# Patient Record
(demographics unavailable — no encounter records)

---

## 2025-04-02 NOTE — PHYSICAL EXAM
[General Appearance - In No Acute Distress] : in no acute distress [General Appearance - Alert] : alert [Oriented To Time, Place, And Person] : oriented to person, place, and time [Impaired Insight] : insight and judgment were intact [Affect] : the affect was normal [Person] : oriented to person [Place] : oriented to place [Time] : oriented to time [Registration Intact] : recent registration memory intact [Concentration Intact] : normal concentrating ability [Naming Objects] : no difficulty naming common objects [Repeating Phrases] : no difficulty repeating a phrase [Fluency] : fluency intact [Comprehension] : comprehension intact [Past History] : adequate knowledge of personal past history [Cranial Nerves Optic (II)] : visual acuity intact bilaterally,  visual fields full to confrontation, pupils equal round and reactive to light [Cranial Nerves Oculomotor (III)] : extraocular motion intact [Cranial Nerves Trigeminal (V)] : facial sensation intact symmetrically [Cranial Nerves Facial (VII)] : face symmetrical [Cranial Nerves Vestibulocochlear (VIII)] : hearing was intact bilaterally [Cranial Nerves Glossopharyngeal (IX)] : tongue and palate midline [Cranial Nerves Accessory (XI - Cranial And Spinal)] : head turning and shoulder shrug symmetric [Cranial Nerves Hypoglossal (XII)] : there was no tongue deviation with protrusion [Motor Tone] : muscle tone was normal in all four extremities [Motor Strength] : muscle strength was normal in all four extremities [No Muscle Atrophy] : normal bulk in all four extremities [Sensation Tactile Decrease] : light touch was intact [Abnormal Walk] : normal gait [Balance] : balance was intact [2+] : Ankle jerk left 2+ [PERRL With Normal Accommodation] : pupils were equal in size, round, reactive to light, with normal accommodation [Extraocular Movements] : extraocular movements were intact [Full Visual Field] : full visual field [Outer Ear] : the ears and nose were normal in appearance [Hearing Threshold Finger Rub Not [place]] : hearing was normal [] : the neck was supple [Neck Cervical Mass (___cm)] : no neck mass was observed [Romberg's Sign] : Romberg's sign was negtive [Past-pointing] : there was no past-pointing [Tremor] : no tremor present [Plantar Reflex Right Only] : normal on the right [Plantar Reflex Left Only] : normal on the left

## 2025-04-02 NOTE — ADDENDUM
[FreeTextEntry1] : Documented by Paolo Gillespie acting as a scribe for Dr. Ramsey and Walt Ramos PA-C on 04/01/2025 and was presence for the following sections: Physical Exam; Data Reviewed; Assessment; Discussion/Summary. All medical record entries made by the Scribe were at my, Dr. Ramsey, and Walt Ramos, direction and personally dictated by me on 04/01/2025. I have reviewed the chart and agree that the record accurately reflects my personal performance of the history, physical exam, procedure and imaging.

## 2025-04-02 NOTE — DATA REVIEWED
[de-identified] : 10/20/22: MRI Right frontal T2 flair signal intensity, new punctate right posterior frontal region, related to gliosis of migraine affect, other etiologies include inflammatory or demyelinating disease. 2/13/20: MRI brain W/C: single T2/flair signal intensity in left frontal periventricular region, non-specific, no enhancing lesions- abnormalities noted 7/21/18: MRI brain: hypoplastic left cerebellum, otherwise normal.   [de-identified] : Labs: B12 65, vitamin D 33.1, TFTs normal,

## 2025-04-02 NOTE — DISCUSSION/SUMMARY
[FreeTextEntry1] : 40-year-old F with PMHx of menstrual migraine headaches and thyroid nodules, with c/o fatigue, intermittent dizziness and feeling of brain fog, was diagnosed with lupus about 4 years ago, has been on Benlysta and hydroxychloroquine. .  #1) Lupus with chronic Fatigue  #2) menstrual migraines; occasional migraines without aura  #3) MRI brain revealed nonspecific white matter changes, prior pattern not suggestive of MS; strong family history of MS/lupus  # Dizziness  - Will repeat MRI brain with sharona to see evolution of finding sin 3 years - Nurtec ODT for menstrual migraines, can repeat in 2-3 hours, Adverse effects discussed with the pt - continue B12/Vit D supplements, Patient reportedly has had labs with rheumatologist

## 2025-04-02 NOTE — HISTORY OF PRESENT ILLNESS
[de-identified] : The patient is a 40 year old right hand dominant female who presents today complaining of right elbow.   Date of Injury/Onset:  ~ 3 months Pain:    At Rest: 5/10  With Activity:  8/10  Mechanism of injury: NKI,  This is NOT a Work Related Injury being treated under Worker's Compensation. This is NOT an athletic injury occurring associated with an interscholastic or organized sports team. Quality of symptoms:  sharp, shooting into right shoulder and wrist.  Improves with: nothing seems to help  Worse with: picking up objects, grabbing, flexing hand up, writing  Prior treatment: none Prior Imaging: none Out of work/sport: no  School/Sport/Position/Occupation: TA, full-time Additional Information: None

## 2025-04-02 NOTE — REVIEW OF SYSTEMS
[Feeling Tired] : feeling tired [As Noted in HPI] : as noted in HPI [Decr. Concentrating Ability] : decreased concentrating ability [Migraine Headache] : migraine headaches [Sleep Disturbances] : sleep disturbances [Joint Pain] : joint pain [Negative] : Integumentary [Dizziness] : no dizziness [de-identified] : Brain fog [FreeTextEntry8] : Increased urinary frequency [FreeTextEntry9] : Tennis elbow- right arm

## 2025-04-02 NOTE — PHYSICAL EXAM
[General Appearance - Alert] : alert [General Appearance - In No Acute Distress] : in no acute distress [Oriented To Time, Place, And Person] : oriented to person, place, and time [Impaired Insight] : insight and judgment were intact [Affect] : the affect was normal [Person] : oriented to person [Place] : oriented to place [Time] : oriented to time [Registration Intact] : recent registration memory intact [Concentration Intact] : normal concentrating ability [Naming Objects] : no difficulty naming common objects [Repeating Phrases] : no difficulty repeating a phrase [] : Resident [Fluency] : fluency intact [Time Spent: ___ minutes] : I have spent [unfilled] minutes of time on the encounter. [Comprehension] : comprehension intact [Past History] : adequate knowledge of personal past history [Cranial Nerves Optic (II)] : visual acuity intact bilaterally,  visual fields full to confrontation, pupils equal round and reactive to light [Cranial Nerves Oculomotor (III)] : extraocular motion intact [Cranial Nerves Trigeminal (V)] : facial sensation intact symmetrically [Cranial Nerves Facial (VII)] : face symmetrical [Cranial Nerves Vestibulocochlear (VIII)] : hearing was intact bilaterally [Cranial Nerves Glossopharyngeal (IX)] : tongue and palate midline [Cranial Nerves Accessory (XI - Cranial And Spinal)] : head turning and shoulder shrug symmetric [Cranial Nerves Hypoglossal (XII)] : there was no tongue deviation with protrusion [Motor Tone] : muscle tone was normal in all four extremities [Motor Strength] : muscle strength was normal in all four extremities [No Muscle Atrophy] : normal bulk in all four extremities [Sensation Tactile Decrease] : light touch was intact [Abnormal Walk] : normal gait [Balance] : balance was intact [2+] : Ankle jerk left 2+ [PERRL With Normal Accommodation] : pupils were equal in size, round, reactive to light, with normal accommodation [Extraocular Movements] : extraocular movements were intact [Full Visual Field] : full visual field [Hearing Threshold Finger Rub Not [place]] : hearing was normal [Outer Ear] : the ears and nose were normal in appearance [] : the neck was supple [Neck Cervical Mass (___cm)] : no neck mass was observed [Romberg's Sign] : Romberg's sign was negtive [Past-pointing] : there was no past-pointing [Tremor] : no tremor present [Plantar Reflex Right Only] : normal on the right [Plantar Reflex Left Only] : normal on the left

## 2025-04-02 NOTE — REVIEW OF SYSTEMS
[Feeling Tired] : feeling tired [As Noted in HPI] : as noted in HPI [Decr. Concentrating Ability] : decreased concentrating ability [Migraine Headache] : migraine headaches [Sleep Disturbances] : sleep disturbances [Joint Pain] : joint pain [Negative] : Integumentary [Dizziness] : no dizziness [de-identified] : Brain fog [FreeTextEntry8] : Increased urinary frequency [FreeTextEntry9] : Tennis elbow- right arm

## 2025-04-02 NOTE — DATA REVIEWED
[de-identified] : 10/20/22: MRI Right frontal T2 flair signal intensity, new punctate right posterior frontal region, related to gliosis of migraine affect, other etiologies include inflammatory or demyelinating disease. 2/13/20: MRI brain W/C: single T2/flair signal intensity in left frontal periventricular region, non-specific, no enhancing lesions- abnormalities noted 7/21/18: MRI brain: hypoplastic left cerebellum, otherwise normal.   [de-identified] : Labs: B12 65, vitamin D 33.1, TFTs normal,

## 2025-04-02 NOTE — PHYSICAL EXAM
[de-identified] : Neurologic: normal mood and affect, orientated and able to communicate Constitutional: well developed and well nourished  Right Elbow: Tenderness over lateral epicondyle ROM pain with pronation pain with supination elbow pain with resisted wrist extension elbow pain with resisted forearm supination

## 2025-04-02 NOTE — DISCUSSION/SUMMARY
[de-identified] : Reviewed all X Ray images with patient today and interpretation was provided. MRI of right elbow to eval ECRB tendon. Packet of exercises provided for home strengthening and/or stretching. ELBOW Patient will follow up with Dr. Fleming after MRI.     ***Patient has Lupus***     ----------------------------------------------- Home Exercise The patient is instructed on a home exercise program.   STEVE GILLESPIE Acting as a Scribe for Dr. Roxy DOWNEY, Steve Gillespie, attest that this documentation has been prepared under the direction and in the presence of Provider Dr. Ramsey.   Activity Modification The patient was advised to modify their activities.   Dx / Natural History The patient was advised of the diagnosis. The natural history of the pathology was explained in full to the patient in layman's terms. Several different treatment options were discussed and explained in full to the patient including the risks and benefits of both surgical and non-surgical treatments.  All questions and concerns were answered.   Pain Guide Activities The patient was advised to let pain guide the gradual advancement of activities.   RICE I explained to the patient that rest, ice, compression, and elevation would benefit them. They may return to activity after follow-up or when they no longer have any pain.   The patient's current medication management of their orthopedic diagnosis was reviewed today: (1) We discussed a comprehensive treatment plan that included possible pharmaceutical management involving the use of prescription strength medications including but not limited to options such as oral Naprosyn 500mg BID, once daily Meloxicam 15 mg, or 500-650 mg Tylenol versus over the counter oral medications and topical prescription NSAID Pennsaid vs over the counter Voltaren gel. (2) There is a moderate risk of morbidity with further treatment, especially from use of prescription strength medications and possible side effects of these medications which include upset stomach with oral medications, skin reactions to topical medications and cardiac/renal issues with long term use. (3) I recommended that the patient follow-up with their medical physician to discuss any significant specific potential issues with long term medication use such as interactions with current medications or with exacerbation of underlying medical comorbidities. (4) The benefits and risks associated with use of injectable, oral or topical, prescription and over the counter anti-inflammatory medications were discussed with the patient. The patient voiced understanding of the risks including but not limited to bleeding, stroke, kidney dysfunction, heart disease, and were referred to the black box warning label for further information.

## 2025-04-02 NOTE — HISTORY OF PRESENT ILLNESS
[de-identified] : The patient is a 40 year old right hand dominant female who presents today complaining of right elbow.   Date of Injury/Onset:  ~ 3 months Pain:    At Rest: 5/10  With Activity:  8/10  Mechanism of injury: NKI,  This is NOT a Work Related Injury being treated under Worker's Compensation. This is NOT an athletic injury occurring associated with an interscholastic or organized sports team. Quality of symptoms:  sharp, shooting into right shoulder and wrist.  Improves with: nothing seems to help  Worse with: picking up objects, grabbing, flexing hand up, writing  Prior treatment: none Prior Imaging: none Out of work/sport: no  School/Sport/Position/Occupation: TA, full-time Additional Information: None

## 2025-04-02 NOTE — DATA REVIEWED
[FreeTextEntry1] : 4/1/25 OC X-RAY Right Elbow 3 views: This scan was reviewed and interpreted by Dr. Ramsey, and his findings are- unremarkable

## 2025-04-02 NOTE — HISTORY OF PRESENT ILLNESS
[FreeTextEntry1] : Patient is here for follow up visit today, the last seen on 6/1/22.Patient reports she continues to have fatigue and intermittent brain fog however she continues to work and keeping herself busy, she has occasional brief vertigo-like episodes, these do not hamper or interfere with her functioning.  Patient does report that she has been on Benlysta once weekly injections and was also started on hydroxychloroquine 200 Mg twice daily, her lupus symptoms are contained but she continues to have joint pains tiredness and fatigue.  Patient continues to have occasional migraines, most without aura, some are preceded by brief aura, she was tried on a triptan earlier did not tolerate it well, she has been taking NSAIDs to obtain relief.  After the last visit, patient had MRI brain done, was done without contrast as she had a questionable reaction previously, study revealed right frontal T2 flair signal intensity, new punctate right posterior frontal region, related to gliosis of migraine affect, other etiologies include inflammatory or demyelinating disease.

## 2025-04-02 NOTE — DISCUSSION/SUMMARY
[de-identified] : Reviewed all X Ray images with patient today and interpretation was provided. MRI of right elbow to eval ECRB tendon. Packet of exercises provided for home strengthening and/or stretching. ELBOW Patient will follow up with Dr. Fleming after MRI.     ***Patient has Lupus***     ----------------------------------------------- Home Exercise The patient is instructed on a home exercise program.   STEVE GILLESPIE Acting as a Scribe for Dr. Roxy DOWNEY, Steve Gillespie, attest that this documentation has been prepared under the direction and in the presence of Provider Dr. Ramsey.   Activity Modification The patient was advised to modify their activities.   Dx / Natural History The patient was advised of the diagnosis. The natural history of the pathology was explained in full to the patient in layman's terms. Several different treatment options were discussed and explained in full to the patient including the risks and benefits of both surgical and non-surgical treatments.  All questions and concerns were answered.   Pain Guide Activities The patient was advised to let pain guide the gradual advancement of activities.   RICE I explained to the patient that rest, ice, compression, and elevation would benefit them. They may return to activity after follow-up or when they no longer have any pain.   The patient's current medication management of their orthopedic diagnosis was reviewed today: (1) We discussed a comprehensive treatment plan that included possible pharmaceutical management involving the use of prescription strength medications including but not limited to options such as oral Naprosyn 500mg BID, once daily Meloxicam 15 mg, or 500-650 mg Tylenol versus over the counter oral medications and topical prescription NSAID Pennsaid vs over the counter Voltaren gel. (2) There is a moderate risk of morbidity with further treatment, especially from use of prescription strength medications and possible side effects of these medications which include upset stomach with oral medications, skin reactions to topical medications and cardiac/renal issues with long term use. (3) I recommended that the patient follow-up with their medical physician to discuss any significant specific potential issues with long term medication use such as interactions with current medications or with exacerbation of underlying medical comorbidities. (4) The benefits and risks associated with use of injectable, oral or topical, prescription and over the counter anti-inflammatory medications were discussed with the patient. The patient voiced understanding of the risks including but not limited to bleeding, stroke, kidney dysfunction, heart disease, and were referred to the black box warning label for further information.

## 2025-04-02 NOTE — PHYSICAL EXAM
[de-identified] : Neurologic: normal mood and affect, orientated and able to communicate Constitutional: well developed and well nourished  Right Elbow: Tenderness over lateral epicondyle ROM pain with pronation pain with supination elbow pain with resisted wrist extension elbow pain with resisted forearm supination

## 2025-04-14 NOTE — IMAGING
[de-identified] : (Exam: Elbow)   Laterality is right    Patient is in no acute distress, alert and oriented Sensation is grossly intact to light touch in the hand Motor function is 5/5 in the hand Capillary refill is less than 2 seconds in the fingers Lymphadenopathy is not present Peripheral edema is not present   Skin is intact Olecranon bursa swelling is not present Biceps deformity is not present Intrinsic atrophy is not present   Lateral epicondyle tenderness is present Medial epicondyle tenderness is not present Radial head tenderness is not present Biceps tenderness is not present Triceps tenderness is not present   Extension is 0 Flexion is 140 Pronation is 80 Supination is 80   Flexion strength is 5/5 Extension strength is 5/5 Pronation strength is 5/5 Supination strength is 5/5   Cozen's test is abnormal Biceps hook test is normal Tinel's test of ulnar nerve is normal Moving valgus stress test is normal [Right] : right elbow [Outside films reviewed] : Outside films reviewed [There are no fractures, subluxations or dislocations. No significant abnormalities are seen] : There are no fractures, subluxations or dislocations. No significant abnormalities are seen

## 2025-04-14 NOTE — DATA REVIEWED
[MRI] : MRI [Right] : of the right [Elbow] : elbow [I independently reviewed and interpreted images and report] : I independently reviewed and interpreted images and report [FreeTextEntry1] : partial tear ECRB

## 2025-04-14 NOTE — DISCUSSION/SUMMARY
[de-identified] : (Impression) -right elbow lateral epicondylitis    The diagnosis was explained in detail. The potential non-surgical and surgical treatments were reviewed. The relative risks and benefits of each option were considered relative to the patient age, activity level, medical history, symptom severity and previously attempted treatments.  The patient was advised to consult with their primary medical provider prior to initiation of any new medications to reduce the risk of adverse effects specific to their long-term home medications and medical history. The risk of gastrointestinal irritation and kidney injury specific to long-term NSAID use was discussed.    (Plan)  -Physical therapy recommended for stretching and strengthening. -Cortisone injection performed for symptom relief. -Over the counter NSAID for pain and inflammation, take as needed. -Topical voltaren gel as needed for pain. -Begin use of counterforce strap during activity and removable wrist brace at night. -Follow up in 8 weeks. If symptoms persist consider surgical options.      (MDM) Problem Complexity -Moderate: chronic illness with exacerbation   Risk -Moderate: injection   Visit Type -Established     (Procedure: Corticosteroid Injection)   Injection location was the: -right elbow lateral epicondyle  Injection contents were: 40 mg of kenalog and 5 mL of 1% lidocaine   Procedure Details: -Informed consent was obtained. Discussed possible risks of corticosteroid injection including hyperglycemia, infection and skin discoloration. -Discussed that due to infection risk, an interval between injection and any future surgery is 6 weeks for arthroscopy and 3 months for arthroplasty. -Injection performed using aseptic technique. Hemostasis was confirmed. -Procedure was tolerated well with no complications.

## 2025-04-14 NOTE — HISTORY OF PRESENT ILLNESS
[de-identified] : Date of initial evaluation: 04/14/2025 Patient age: 40 year Body part causing symptoms: right elbow Location of the pain: lateral, medial Pain score today: 8/10 Duration of symptoms: 3 months History of injury: none Activities that worsen the pain: picking up objects, grabbing, flexing hand up, writing Radicular symptoms: none Medications attempted for this problem: Ibuprofen, Aleve PT for this problem: none Injections for this problem: none Previous surgery on this body part: none Prior imaging: xray merge pacs, MRI ZP Occupation: TA

## 2025-06-10 NOTE — IMAGING
[Right] : right elbow [Outside films reviewed] : Outside films reviewed [There are no fractures, subluxations or dislocations. No significant abnormalities are seen] : There are no fractures, subluxations or dislocations. No significant abnormalities are seen [de-identified] : (Exam: Elbow)   Laterality is right    Patient is in no acute distress, alert and oriented Sensation is grossly intact to light touch in the hand Motor function is 5/5 in the hand Capillary refill is less than 2 seconds in the fingers Lymphadenopathy is not present Peripheral edema is not present   Skin is intact Olecranon bursa swelling is not present Biceps deformity is not present Intrinsic atrophy is not present   Lateral epicondyle tenderness is present Medial epicondyle tenderness is not present Radial head tenderness is not present Biceps tenderness is not present Triceps tenderness is not present   Extension is 0 Flexion is 140 Pronation is 80 Supination is 80   Flexion strength is 5/5 Extension strength is 5/5 Pronation strength is 5/5 Supination strength is 5/5   Cozen's test is abnormal Biceps hook test is normal Tinel's test of ulnar nerve is normal Moving valgus stress test is normal

## 2025-06-10 NOTE — DISCUSSION/SUMMARY
[de-identified] : (Impression) -right elbow lateral epicondylitis    The diagnosis was explained in detail. The potential non-surgical and surgical treatments were reviewed. The relative risks and benefits of each option were considered relative to the patient age, activity level, medical history, symptom severity and previously attempted treatments.  The patient was advised to consult with their primary medical provider prior to initiation of any new medications to reduce the risk of adverse effects specific to their long-term home medications and medical history. The risk of gastrointestinal irritation and kidney injury specific to long-term NSAID use was discussed.    (Plan)  -Physical therapy recommended for stretching and strengthening. -Additional injection deferred.  -Meloxicam for pain and inflammation, take as needed. -Topical voltaren gel as needed for pain. -Resume use of counterforce strap during activity and removable wrist brace at night. -Surgical options are discussed and deferred at this time. -Follow up in 6 weeks. If symptoms persist consider repeat CSI vs surgical options.      (MDM) Problem Complexity -Moderate: chronic illness with exacerbation   Risk -Moderate: prescription medication   Visit Type -Established

## 2025-06-10 NOTE — HISTORY OF PRESENT ILLNESS
[de-identified] : 06/10/2025: f/u for right elbow.  CSI performed on 04/14/2025 with relief for 3 weeks before pain returned. doing HEP has not attended formal PT yet. not taking anything for pain. not using counterforce strap as it was worsening the pain.   Date of initial evaluation: 04/14/2025 Patient age: 40 year Body part causing symptoms: right elbow Location of the pain: lateral, medial Pain score today: 8/10 Duration of symptoms: 3 months History of injury: none Activities that worsen the pain: picking up objects, grabbing, flexing hand up, writing Radicular symptoms: none Medications attempted for this problem: Ibuprofen, Aleve PT for this problem: none Injections for this problem: none Previous surgery on this body part: none Prior imaging: xray merge pacs, MRI ZP Occupation: TA

## 2025-07-24 NOTE — HISTORY OF PRESENT ILLNESS
[de-identified] : 07/24/2025: f/u for right elbow.  finished PT, now doing HEP.  taking Meloxicam prn.  reports improvement in symptoms since last visit.   06/10/2025: f/u for right elbow.  CSI performed on 04/14/2025 with relief for 3 weeks before pain returned. doing HEP has not attended formal PT yet. not taking anything for pain. not using counterforce strap as it was worsening the pain.   Date of initial evaluation: 04/14/2025 Patient age: 40 year Body part causing symptoms: right elbow Location of the pain: lateral, medial Pain score today: 8/10 Duration of symptoms: 3 months History of injury: none Activities that worsen the pain: picking up objects, grabbing, flexing hand up, writing Radicular symptoms: none Medications attempted for this problem: Ibuprofen, Aleve PT for this problem: none Injections for this problem: none Previous surgery on this body part: none Prior imaging: xray merge pacs, MRI ZP Occupation: [employer]

## 2025-07-24 NOTE — IMAGING
Dr Nils Mojica made aware of message [Right] : right elbow [Outside films reviewed] : Outside films reviewed [There are no fractures, subluxations or dislocations. No significant abnormalities are seen] : There are no fractures, subluxations or dislocations. No significant abnormalities are seen [de-identified] : (Exam: Elbow)   Laterality is right    Patient is in no acute distress, alert and oriented Sensation is grossly intact to light touch in the hand Motor function is 5/5 in the hand Capillary refill is less than 2 seconds in the fingers Lymphadenopathy is not present Peripheral edema is not present   Skin is intact Olecranon bursa swelling is not present Biceps deformity is not present Intrinsic atrophy is not present   Lateral epicondyle tenderness is present Medial epicondyle tenderness is not present Radial head tenderness is not present Biceps tenderness is not present Triceps tenderness is not present   Extension is 0 Flexion is 140 Pronation is 80 Supination is 80   Flexion strength is 5/5 Extension strength is 5/5 Pronation strength is 5/5 Supination strength is 5/5   Cozen's test is abnormal Biceps hook test is normal Tinel's test of ulnar nerve is normal Moving valgus stress test is normal

## 2025-07-24 NOTE — DISCUSSION/SUMMARY
[de-identified] : (Impression) -right elbow lateral epicondylitis    The diagnosis was explained in detail. The potential non-surgical and surgical treatments were reviewed. The relative risks and benefits of each option were considered relative to the patient age, activity level, medical history, symptom severity and previously attempted treatments.  The patient was advised to consult with their primary medical provider prior to initiation of any new medications to reduce the risk of adverse effects specific to their long-term home medications and medical history. The risk of gastrointestinal irritation and kidney injury specific to long-term NSAID use was discussed.    (Plan)  -Recommend HEP.  -Additional injection deferred.  -Meloxicam for pain and inflammation, take as needed. -Topical voltaren gel as needed for pain. -Continue use of counterforce strap during activity and removable wrist brace at night. -Surgical options are discussed and deferred at this time. -Follow up as needed. If symptoms worsen consider repeat CSI vs surgical options.      (MDM) Problem Complexity -Moderate: chronic illness with exacerbation   Risk -Moderate: prescription medication   Visit Type -Established